# Patient Record
Sex: FEMALE | URBAN - METROPOLITAN AREA
[De-identification: names, ages, dates, MRNs, and addresses within clinical notes are randomized per-mention and may not be internally consistent; named-entity substitution may affect disease eponyms.]

---

## 2019-05-22 ENCOUNTER — APPOINTMENT (RX ONLY)
Dept: URBAN - METROPOLITAN AREA CLINIC 347 | Facility: CLINIC | Age: 49
Setting detail: DERMATOLOGY
End: 2019-05-22

## 2019-05-22 DIAGNOSIS — L81.1 CHLOASMA: ICD-10-CM

## 2019-05-22 PROBLEM — L55.1 SUNBURN OF SECOND DEGREE: Status: ACTIVE | Noted: 2019-05-22

## 2019-05-22 PROCEDURE — 99202 OFFICE O/P NEW SF 15 MIN: CPT

## 2019-05-22 PROCEDURE — ? COUNSELING

## 2019-05-22 PROCEDURE — ? PRESCRIPTION

## 2019-05-22 PROCEDURE — ? TREATMENT REGIMEN

## 2019-05-22 RX ORDER — HYALURONATE SODIUM 0.2 %
GEL (GRAM) TOPICAL
Qty: 1 | Refills: 5 | Status: ERX

## 2019-05-22 ASSESSMENT — LOCATION DETAILED DESCRIPTION DERM
LOCATION DETAILED: RIGHT CENTRAL MALAR CHEEK
LOCATION DETAILED: LEFT CENTRAL MALAR CHEEK
LOCATION DETAILED: RIGHT MEDIAL FOREHEAD
LOCATION DETAILED: RIGHT CHIN

## 2019-05-22 ASSESSMENT — LOCATION SIMPLE DESCRIPTION DERM
LOCATION SIMPLE: CHIN
LOCATION SIMPLE: LEFT CHEEK
LOCATION SIMPLE: RIGHT FOREHEAD
LOCATION SIMPLE: RIGHT CHEEK

## 2019-05-22 ASSESSMENT — SEVERITY ASSESSMENT: SEVERITY: MILD, SLIGHTLY DARKER THAN THE SURROUNDING NORMAL SKIN

## 2019-05-22 ASSESSMENT — LOCATION ZONE DERM: LOCATION ZONE: FACE

## 2019-05-22 NOTE — PROCEDURE: TREATMENT REGIMEN
Otc Regimen: Sunscreen- SPF 50+ with zinc - Apply To full Face Every morning.
Detail Level: Zone
Initiate Treatment: Skin Better Science Alpharet intense Overnight - Apply one Pump To full Face Nightly after Cleansing \\nBionect- Apply pea sized Amount To full Face Every morning and night.

## 2021-01-06 ENCOUNTER — RX ONLY (OUTPATIENT)
Age: 51
Setting detail: RX ONLY
End: 2021-01-06

## 2021-01-06 RX ORDER — HYALURONATE SODIUM 0.2 %
CREAM (GRAM) TOPICAL
Qty: 1 | Refills: 4 | Status: ERX

## 2023-03-29 ENCOUNTER — APPOINTMENT (RX ONLY)
Dept: URBAN - METROPOLITAN AREA CLINIC 340 | Facility: CLINIC | Age: 53
Setting detail: DERMATOLOGY
End: 2023-03-29

## 2023-03-29 DIAGNOSIS — L81.4 OTHER MELANIN HYPERPIGMENTATION: ICD-10-CM | Status: STABLE

## 2023-03-29 DIAGNOSIS — L81.1 CHLOASMA: ICD-10-CM | Status: INADEQUATELY CONTROLLED

## 2023-03-29 DIAGNOSIS — L81.6 OTHER DISORDERS OF DIMINISHED MELANIN FORMATION: ICD-10-CM | Status: STABLE

## 2023-03-29 PROCEDURE — 99203 OFFICE O/P NEW LOW 30 MIN: CPT

## 2023-03-29 PROCEDURE — ? PRESCRIPTION MEDICATION MANAGEMENT

## 2023-03-29 PROCEDURE — ? PRESCRIPTION

## 2023-03-29 PROCEDURE — ? COUNSELING

## 2023-03-29 PROCEDURE — ? OTC TREATMENT REGIMEN

## 2023-03-29 RX ORDER — HYDROQUINONE 4 %
CREAM (GRAM) TOPICAL
Qty: 1 | Refills: 5 | Status: CANCELLED

## 2023-03-29 ASSESSMENT — LOCATION DETAILED DESCRIPTION DERM
LOCATION DETAILED: RIGHT MEDIAL FOREHEAD
LOCATION DETAILED: LEFT DISTAL DORSAL FOREARM
LOCATION DETAILED: RIGHT CENTRAL MALAR CHEEK
LOCATION DETAILED: RIGHT PROXIMAL DORSAL FOREARM
LOCATION DETAILED: RIGHT CHIN
LOCATION DETAILED: LEFT CENTRAL MALAR CHEEK

## 2023-03-29 ASSESSMENT — LOCATION SIMPLE DESCRIPTION DERM
LOCATION SIMPLE: RIGHT CHEEK
LOCATION SIMPLE: RIGHT FOREARM
LOCATION SIMPLE: RIGHT FOREHEAD
LOCATION SIMPLE: LEFT CHEEK
LOCATION SIMPLE: LEFT FOREARM
LOCATION SIMPLE: CHIN

## 2023-03-29 ASSESSMENT — LOCATION ZONE DERM
LOCATION ZONE: ARM
LOCATION ZONE: FACE

## 2023-03-29 NOTE — PROCEDURE: OTC TREATMENT REGIMEN
Patient Specific Otc Recommendations (Will Not Stick From Patient To Patient): Cerave or [place] ro[place] posay cleanser\\nElta MD sunscreen\\n\\ChandanaA BAHA cleanser\\nSBS intensive Alpharet\\nZO pigment control cream
Detail Level: Zone

## 2023-03-29 NOTE — PROCEDURE: PRESCRIPTION MEDICATION MANAGEMENT
Samples Given: Cerave hydrating cleanser x4\\n[place] roche posay x4
Render In Strict Bullet Format?: No
Discontinue Regimen: Bionect gel
Initiate Treatment: Gentle cleansers: use 1x daily (cerave hydrating cleanser and [place] ro[place] posay lipikar)\\n\\nAHA BAHA cleanser: use 1x at nightly in the shower\\nZO pigment corrector- use 1x nightly after cleansing (free, mix with SBS intensive Alpharet)\\nSBS intensive Alpharet: apply to face 1x nightly after cleansing
Detail Level: Zone
Plan: Future:\\nHydroquinone 4% cream (did not send today b/c patient bought 2 products and we gave her ZO pigment control cream for free per CE)\\nSummer compound: hydroquinone 8%